# Patient Record
Sex: MALE | Race: BLACK OR AFRICAN AMERICAN | NOT HISPANIC OR LATINO | Employment: UNEMPLOYED | URBAN - METROPOLITAN AREA
[De-identification: names, ages, dates, MRNs, and addresses within clinical notes are randomized per-mention and may not be internally consistent; named-entity substitution may affect disease eponyms.]

---

## 2024-08-12 ENCOUNTER — APPOINTMENT (OUTPATIENT)
Dept: RADIOLOGY | Facility: HOSPITAL | Age: 39
End: 2024-08-12
Payer: COMMERCIAL

## 2024-08-12 ENCOUNTER — HOSPITAL ENCOUNTER (EMERGENCY)
Facility: HOSPITAL | Age: 39
Discharge: HOME | End: 2024-08-12
Attending: EMERGENCY MEDICINE
Payer: COMMERCIAL

## 2024-08-12 ENCOUNTER — APPOINTMENT (OUTPATIENT)
Dept: CARDIOLOGY | Facility: HOSPITAL | Age: 39
End: 2024-08-12
Payer: COMMERCIAL

## 2024-08-12 VITALS
HEART RATE: 50 BPM | HEIGHT: 71 IN | DIASTOLIC BLOOD PRESSURE: 74 MMHG | BODY MASS INDEX: 33.04 KG/M2 | WEIGHT: 236 LBS | SYSTOLIC BLOOD PRESSURE: 159 MMHG | TEMPERATURE: 99 F | OXYGEN SATURATION: 100 % | RESPIRATION RATE: 16 BRPM

## 2024-08-12 DIAGNOSIS — N50.82 SCROTAL PAIN: Primary | ICD-10-CM

## 2024-08-12 DIAGNOSIS — R07.9 CHEST PAIN, UNSPECIFIED TYPE: ICD-10-CM

## 2024-08-12 LAB
ALBUMIN SERPL BCP-MCNC: 4.2 G/DL (ref 3.4–5)
ALP SERPL-CCNC: 35 U/L (ref 33–120)
ALT SERPL W P-5'-P-CCNC: 29 U/L (ref 10–52)
ANION GAP SERPL CALC-SCNC: 14 MMOL/L (ref 10–20)
APPEARANCE UR: CLEAR
AST SERPL W P-5'-P-CCNC: 27 U/L (ref 9–39)
BASOPHILS # BLD AUTO: 0.03 X10*3/UL (ref 0–0.1)
BASOPHILS NFR BLD AUTO: 0.4 %
BILIRUB SERPL-MCNC: 0.7 MG/DL (ref 0–1.2)
BILIRUB UR STRIP.AUTO-MCNC: ABNORMAL MG/DL
BUN SERPL-MCNC: 7 MG/DL (ref 6–23)
CALCIUM SERPL-MCNC: 9.1 MG/DL (ref 8.6–10.3)
CHLORIDE SERPL-SCNC: 105 MMOL/L (ref 98–107)
CO2 SERPL-SCNC: 23 MMOL/L (ref 21–32)
COLOR UR: YELLOW
CREAT SERPL-MCNC: 1.01 MG/DL (ref 0.5–1.3)
D DIMER PPP FEU-MCNC: 416 NG/ML FEU
EGFRCR SERPLBLD CKD-EPI 2021: >90 ML/MIN/1.73M*2
EOSINOPHIL # BLD AUTO: 0.02 X10*3/UL (ref 0–0.7)
EOSINOPHIL NFR BLD AUTO: 0.3 %
ERYTHROCYTE [DISTWIDTH] IN BLOOD BY AUTOMATED COUNT: 13.5 % (ref 11.5–14.5)
GLUCOSE SERPL-MCNC: 103 MG/DL (ref 74–99)
GLUCOSE UR STRIP.AUTO-MCNC: NORMAL MG/DL
HCT VFR BLD AUTO: 40.5 % (ref 41–52)
HGB BLD-MCNC: 13.2 G/DL (ref 13.5–17.5)
HOLD SPECIMEN: NORMAL
IMM GRANULOCYTES # BLD AUTO: 0.01 X10*3/UL (ref 0–0.7)
IMM GRANULOCYTES NFR BLD AUTO: 0.1 % (ref 0–0.9)
KETONES UR STRIP.AUTO-MCNC: ABNORMAL MG/DL
LEUKOCYTE ESTERASE UR QL STRIP.AUTO: ABNORMAL
LYMPHOCYTES # BLD AUTO: 1.39 X10*3/UL (ref 1.2–4.8)
LYMPHOCYTES NFR BLD AUTO: 20.7 %
MCH RBC QN AUTO: 26.3 PG (ref 26–34)
MCHC RBC AUTO-ENTMCNC: 32.6 G/DL (ref 32–36)
MCV RBC AUTO: 81 FL (ref 80–100)
MONOCYTES # BLD AUTO: 0.57 X10*3/UL (ref 0.1–1)
MONOCYTES NFR BLD AUTO: 8.5 %
MUCOUS THREADS #/AREA URNS AUTO: NORMAL /LPF
NEUTROPHILS # BLD AUTO: 4.71 X10*3/UL (ref 1.2–7.7)
NEUTROPHILS NFR BLD AUTO: 70 %
NITRITE UR QL STRIP.AUTO: NEGATIVE
NRBC BLD-RTO: 0 /100 WBCS (ref 0–0)
PH UR STRIP.AUTO: 6 [PH]
PLATELET # BLD AUTO: 278 X10*3/UL (ref 150–450)
POTASSIUM SERPL-SCNC: 3.7 MMOL/L (ref 3.5–5.3)
PROT SERPL-MCNC: 7.2 G/DL (ref 6.4–8.2)
PROT UR STRIP.AUTO-MCNC: ABNORMAL MG/DL
RBC # BLD AUTO: 5.01 X10*6/UL (ref 4.5–5.9)
RBC # UR STRIP.AUTO: NEGATIVE /UL
RBC #/AREA URNS AUTO: NORMAL /HPF
SODIUM SERPL-SCNC: 138 MMOL/L (ref 136–145)
SP GR UR STRIP.AUTO: 1.04
SQUAMOUS #/AREA URNS AUTO: NORMAL /HPF
UROBILINOGEN UR STRIP.AUTO-MCNC: ABNORMAL MG/DL
WBC # BLD AUTO: 6.7 X10*3/UL (ref 4.4–11.3)
WBC #/AREA URNS AUTO: NORMAL /HPF

## 2024-08-12 PROCEDURE — 99284 EMERGENCY DEPT VISIT MOD MDM: CPT | Mod: 25

## 2024-08-12 PROCEDURE — 87086 URINE CULTURE/COLONY COUNT: CPT | Mod: PORLAB | Performed by: EMERGENCY MEDICINE

## 2024-08-12 PROCEDURE — 71046 X-RAY EXAM CHEST 2 VIEWS: CPT

## 2024-08-12 PROCEDURE — 87491 CHLMYD TRACH DNA AMP PROBE: CPT | Mod: PORLAB | Performed by: EMERGENCY MEDICINE

## 2024-08-12 PROCEDURE — 81003 URINALYSIS AUTO W/O SCOPE: CPT | Performed by: EMERGENCY MEDICINE

## 2024-08-12 PROCEDURE — 93005 ELECTROCARDIOGRAM TRACING: CPT

## 2024-08-12 PROCEDURE — 96374 THER/PROPH/DIAG INJ IV PUSH: CPT

## 2024-08-12 PROCEDURE — 80053 COMPREHEN METABOLIC PANEL: CPT | Performed by: EMERGENCY MEDICINE

## 2024-08-12 PROCEDURE — 85025 COMPLETE CBC W/AUTO DIFF WBC: CPT | Performed by: EMERGENCY MEDICINE

## 2024-08-12 PROCEDURE — 76870 US EXAM SCROTUM: CPT | Performed by: RADIOLOGY

## 2024-08-12 PROCEDURE — 36415 COLL VENOUS BLD VENIPUNCTURE: CPT | Performed by: EMERGENCY MEDICINE

## 2024-08-12 PROCEDURE — 76870 US EXAM SCROTUM: CPT

## 2024-08-12 PROCEDURE — 2500000004 HC RX 250 GENERAL PHARMACY W/ HCPCS (ALT 636 FOR OP/ED): Performed by: EMERGENCY MEDICINE

## 2024-08-12 PROCEDURE — 85379 FIBRIN DEGRADATION QUANT: CPT | Performed by: EMERGENCY MEDICINE

## 2024-08-12 RX ORDER — NAPROXEN 500 MG/1
500 TABLET ORAL
Qty: 30 TABLET | Refills: 0 | Status: SHIPPED | OUTPATIENT
Start: 2024-08-12 | End: 2024-08-27

## 2024-08-12 RX ORDER — KETOROLAC TROMETHAMINE 30 MG/ML
15 INJECTION, SOLUTION INTRAMUSCULAR; INTRAVENOUS ONCE
Status: COMPLETED | OUTPATIENT
Start: 2024-08-12 | End: 2024-08-12

## 2024-08-12 ASSESSMENT — LIFESTYLE VARIABLES
HAVE PEOPLE ANNOYED YOU BY CRITICIZING YOUR DRINKING: NO
EVER HAD A DRINK FIRST THING IN THE MORNING TO STEADY YOUR NERVES TO GET RID OF A HANGOVER: NO
TOTAL SCORE: 0
EVER FELT BAD OR GUILTY ABOUT YOUR DRINKING: NO
HAVE YOU EVER FELT YOU SHOULD CUT DOWN ON YOUR DRINKING: NO

## 2024-08-12 ASSESSMENT — PAIN DESCRIPTION - LOCATION: LOCATION: CHEST

## 2024-08-12 ASSESSMENT — COLUMBIA-SUICIDE SEVERITY RATING SCALE - C-SSRS
2. HAVE YOU ACTUALLY HAD ANY THOUGHTS OF KILLING YOURSELF?: NO
6. HAVE YOU EVER DONE ANYTHING, STARTED TO DO ANYTHING, OR PREPARED TO DO ANYTHING TO END YOUR LIFE?: NO
1. IN THE PAST MONTH, HAVE YOU WISHED YOU WERE DEAD OR WISHED YOU COULD GO TO SLEEP AND NOT WAKE UP?: NO

## 2024-08-12 ASSESSMENT — PAIN - FUNCTIONAL ASSESSMENT: PAIN_FUNCTIONAL_ASSESSMENT: 0-10

## 2024-08-12 ASSESSMENT — PAIN SCALES - GENERAL
PAINLEVEL_OUTOF10: 7
PAINLEVEL_OUTOF10: 3
PAINLEVEL_OUTOF10: 7

## 2024-08-12 ASSESSMENT — PAIN DESCRIPTION - DESCRIPTORS
DESCRIPTORS: SHARP
DESCRIPTORS: SPASM;SHARP

## 2024-08-12 ASSESSMENT — PAIN DESCRIPTION - PAIN TYPE: TYPE: ACUTE PAIN

## 2024-08-12 ASSESSMENT — PAIN DESCRIPTION - FREQUENCY: FREQUENCY: CONSTANT/CONTINUOUS

## 2024-08-12 ASSESSMENT — PAIN DESCRIPTION - ORIENTATION: ORIENTATION: LEFT

## 2024-08-12 NOTE — ED PROVIDER NOTES
HPI   Chief Complaint   Patient presents with    sharp cp left lung with deep breath/ testicular pain       HPI    Patient is a 38 yo male presenting with a one day history of left sided chest/back pain as well as a one month history of scrotal pain. PMH only notable for sciatica. In regards to chest pain, he reports it started last night and exacerbated by deep breaths and sharp in quality. Pain is localized to under the left breast and radiates to the scapula. Denies fever, SOB, injury to the area. States he has a cough for the past two weeks and smokes marijuana. Denies family history of clotting disorder and recent travel history. In regards to the scrotal pain, this has been ongoing for a month. States he has one sexual partner and has not been recently tested for STIs. Reports dark urine and burning yesterday.    ROS: Complete 12 point review of systems performed, otherwise negative except as noted in the history of present illness    PMH: Reviewed, documented below in note. Pertinents in HPI  PSH: Reviewed and documented below in note. Pertinents in HPI  SH: No tobacco, marijuana use not daily  Fam: Reviewed, noncontributory to patients current complaint  MEDS: Reviewed and documented below in note. Pertinents in HPI  ALLERGIES: Reviewed and documented below in note.    Patient History   No past medical history on file.  No past surgical history on file.  No family history on file.  Social History     Tobacco Use    Smoking status: Not on file    Smokeless tobacco: Not on file   Substance Use Topics    Alcohol use: Not on file    Drug use: Not on file       Physical Exam   ED Triage Vitals [08/12/24 0731]   Temperature Heart Rate Respirations BP   37.2 °C (99 °F) 73 18 (!) 186/87      Pulse Ox Temp Source Heart Rate Source Patient Position   99 % Temporal Monitor Sitting      BP Location FiO2 (%)     Left arm --       Physical Exam  Exam conducted with a chaperone present.   Constitutional:       General: He  is not in acute distress.  Cardiovascular:      Rate and Rhythm: Normal rate and regular rhythm.      Pulses: Normal pulses.      Heart sounds: Normal heart sounds.   Pulmonary:      Effort: Pulmonary effort is normal.      Breath sounds: Normal breath sounds.   Genitourinary:     Pubic Area: No rash.       Penis: Normal.       Testes:         Right: Tenderness present.         Left: Tenderness present.      Epididymis:      Right: Tenderness present.   Musculoskeletal:      Cervical back: Normal range of motion.   Neurological:      Mental Status: He is alert.           ED Course & MDM   Diagnoses as of 08/12/24 1052   Scrotal pain   Chest pain, unspecified type                 No data recorded     [place] Coma Scale Score: 15 (08/12/24 0735 : Miriam Anaya RN)                           Medical Decision Making    Patient is a 38 yo male presenting with a one day history of left sided chest/back pain as well as a one month history of scrotal pain. To evaluate the scrotal pain a scrotal ultrasound, UA, and C. Trachomatis/ N. Gonorrheae ordered. Differentials included epididymitis vs STI vs UTI vs testicular torsion. In regards to the chest pain a D-dimer, CBC, and CMP indicated for potential pulmonary embolism. Differentials include pulmonary embolism vs MI vs costochondritis vs pericarditis. CBC and CMP did not reveal any abnormalities. UA displayed protein, ketones, bilirubin, urobilinogen, and leukocyte esterase in the urine. Unlikely this is a bacterial infection and does not require antibiotic treatment. 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 Chest x-ray had no evidence of acute cardiopulmonary process. Most likely this patient has costochondritis which can be treated with NSAIDs. Awaiting C. Trachomatis/ N. Gonorrheae results and patient will be notified when resulted. Given naproxen upon discharge. Patient stable at discharge. Follow up with PCP within  seven days.     Procedure  Procedures     Kelly Mackey  08/12/24 1059

## 2024-08-13 LAB
BACTERIA UR CULT: NO GROWTH
C TRACH RRNA SPEC QL NAA+PROBE: NEGATIVE
N GONORRHOEA DNA SPEC QL PROBE+SIG AMP: NEGATIVE

## 2024-08-14 LAB
ATRIAL RATE: 0 BPM
P AXIS: 13 DEGREES
PR INTERVAL: 106 MS
Q ONSET: 252 MS
QRS COUNT: 9 BEATS
QRS DURATION: 104 MS
QT INTERVAL: 406 MS
QTC CALCULATION(BAZETT): 392 MS
QTC FREDERICIA: 397 MS
R AXIS: -81 DEGREES
T AXIS: 52 DEGREES
T OFFSET: 455 MS
VENTRICULAR RATE: 56 BPM

## 2024-08-16 ENCOUNTER — OFFICE VISIT (OUTPATIENT)
Dept: UROLOGY | Facility: CLINIC | Age: 39
End: 2024-08-16
Payer: COMMERCIAL

## 2024-08-16 VITALS — HEIGHT: 71 IN | WEIGHT: 236 LBS | BODY MASS INDEX: 33.04 KG/M2

## 2024-08-16 DIAGNOSIS — N41.0 ACUTE PROSTATITIS: Primary | ICD-10-CM

## 2024-08-16 LAB
POC APPEARANCE, URINE: CLEAR
POC BILIRUBIN, URINE: ABNORMAL
POC BLOOD, URINE: NEGATIVE
POC COLOR, URINE: YELLOW
POC GLUCOSE, URINE: NEGATIVE MG/DL
POC KETONES, URINE: ABNORMAL MG/DL
POC LEUKOCYTES, URINE: NEGATIVE
POC NITRITE,URINE: NEGATIVE
POC PH, URINE: 6 PH
POC PROTEIN, URINE: ABNORMAL MG/DL
POC SPECIFIC GRAVITY, URINE: >=1.03
POC UROBILINOGEN, URINE: 4 EU/DL

## 2024-08-16 PROCEDURE — 99203 OFFICE O/P NEW LOW 30 MIN: CPT | Performed by: UROLOGY

## 2024-08-16 PROCEDURE — 81003 URINALYSIS AUTO W/O SCOPE: CPT | Performed by: UROLOGY

## 2024-08-16 RX ORDER — CIPROFLOXACIN 500 MG/1
500 TABLET ORAL 2 TIMES DAILY
Qty: 42 TABLET | Refills: 0 | Status: SHIPPED | OUTPATIENT
Start: 2024-08-16 | End: 2024-09-06

## 2024-08-16 ASSESSMENT — PAIN SCALES - GENERAL: PAINLEVEL: 5

## 2024-08-16 NOTE — PROGRESS NOTES
08/16/2024  39-year-old gentleman presents 2 months perineum pain and ED    Patient has no nausea, no vomiting, no fever.    Exam: Uncircumcised normal penis normal testes bilaterally    AYAAN: Significant tenderness of prostate and a mild swelling    We discussed acute prostatitis, Cipro 500 mg twice a day for 3 weeks  We discussed erectile dysfunction, possible secondary to prostatitis  All the questions were answered, the patient expressed understanding and agreed to the plan.    Impression  Prostatitis, acute/chronic  ED    Plan  Reassurance  Cipro 500 mg twice a day for 3 weeks  Call back if no help    No chief complaint on file.       Physical Exam     TODAYS LAB RESULTS:  Urine Culture  Order: [id number] - Reflex for Order [id number]   Collected 8/12/2024 09:15       Status: Final result       Visible to patient: Yes (seen)    Specimen Information: Clean Catch/Voided; Urine   0 Result Notes  Urine Culture No growth           Resulting Agency: [facility]           Specimen Collected: 08/12/24 09:15 Last Resulted: 08/13/24 12:37       Order Details        View Encounter        Lab and Collection Details        Routing        Result History     View All Conversations on this Encounter           Result Care Coordination      Patient Communication     Add Comments   Seen Back to Top       Contains abnormal data Urinalysis with Reflex Culture and Microscopic  Order: [id number] - Part of Panel Order [id number]   Collected 8/12/2024 09:15         Status: Final result         Visible to patient: Yes (seen)      0 Result Notes           Component  Ref Range & Units 4 d ago 6 yr ago   Color, Urine  Light-Yellow, Yellow, Dark-Yellow Yellow YELLOW R   Appearance, Urine  Clear Clear CLEAR R   Specific Gravity, Urine  1.005 - 1.035 1.039 Normal (N) 1.021   pH, Urine  5.0, 5.5, 6.0, 6.5, 7.0, 7.5, 8.0 6.0 6.0 R   Protein, Urine  NEGATIVE, 10 (TRACE), 20 (TRACE) mg/dL 70 (1+) Abnormal  NEGATIVE R   Glucose, Urine  Normal mg/dL Normal  NEGATIVE R   Blood, Urine  NEGATIVE NEGATIVE NEGATIVE   Ketones, Urine  NEGATIVE mg/dL 100 (3+) Abnormal  5(Trace) Abnormal    Bilirubin, Urine  NEGATIVE 0.5 (1+) Abnormal  NEGATIVE   Urobilinogen, Urine  Normal mg/dL 6 (2+) Abnormal  4.0 High  R, CM   Comment: Some pigments and medications may cause a false positive urobilinogen.   Nitrite, Urine  NEGATIVE NEGATIVE NEGATIVE   Leukocyte Esterase, Urine  NEGATIVE 25 Juan C/µL Abnormal  NEGATIVE   Resulting [facility]              Specimen Collected: 08/12/24 09:15 Last Resulted: 08/12/24 09:35        Lab Flowsheet          Order Details          View Encounter          Lab and Collection Details          Routing          Result History       View All Conversations on this Encounter        CM=Additional comments  R=Reference range differs from displayed range        Result Care Coordination      Patient Communication     Add Comments   Seen Back to Top      Microscopic Only, Urine  Order: [id number] - Reflex for Order [id number]   Collected 8/12/2024 09:15         Status: Final result         Visible to patient: Yes (seen)      0 Result Notes          Component  Ref Range & Units 4 d ago   WBC, Urine  1-5, NONE /HPF 1-5   RBC, Urine  NONE, 1-2, 3-5 /HPF 3-5   Squamous Epithelial Cells, Urine  Reference range not established. /HPF 1-9 (SPARSE)   Mucus, Urine  Reference range not established. /LPF 4+   Resulting [facility]              Specimen Collected: 08/12/24 09:15 Last Resulted: 08/12/24 09:35        Lab Flowsheet          Order Details          View Encounter          Lab and Collection Details          Routing          Result History       View All Conversations on this Encounter           Result Care Coordination      Patient Communication     Add Comments   Seen Back to Top        Result Report    Urine Culture (Order #[id number]) on 8/12/24   Microscopic Only, Urine  Order: [id number] - Reflex for Order [id number]   Status: Final result        Visible to patient: Yes (seen)    0 Result Notes      Component  Ref Range & Units 4 d ago   WBC, Urine  1-5, NONE /HPF 1-5   RBC, Urine  NONE, 1-2, 3-5 /HPF 3-5   Squamous Epithelial Cells, Urine  Reference range not established. /HPF 1-9 (SPARSE)   Mucus, Urine  Reference range not established. /LPF 4+   Resulting Agency [facility]              Specimen Collected: 08/12/24 09:15 Last Resulted: 08/12/24 09:35        Lab Flowsheet        Order Details        View Encounter        Lab and Collection Details        Routing        Result History     View All Conversations on this Encounter           Result Care Coordination      Patient Communication     Add Comments   Seen Back to Top      Urine Culture  Order: [id number] - Reflex for Order [id number]   Status: Final result         Visible to patient: Yes (seen)      Specimen Information: Clean Catch/Voided; Urine   0 Result Notes      Urine Culture No growth           Resulting Agency: [facility]           Specimen Collected: 08/12/24 09:15 Last Resulted: 08/13/24 12:37       Order Details          View Encounter          Lab and Collection Details          Routing          Result History       View All Conversations on this Encounter           Result Care Coordination      Patient Communication     Add Comments   Seen Back to Top          ASSESSMENT&PLAN:      IMPRESSIONS:      S scrotum  Status: Final result     PACS Images     Show images for US scrotum  Signed by    Signed Time Phone Pager   Farhan Stringer MD 8/12/2024 10:35 [phone]     Exam Information    Status Exam Begun Exam Ended   Final 8/12/2024 09:48 8/12/2024 10:16     Study Result    Narrative & Impression   Interpreted By:  Farhan Stringer,   STUDY:  US SCROTUM;  8/12/2024 10:16 am      INDICATION:  Signs/Symptoms:testicle pain.      COMPARISON:  None.      ACCESSION NUMBER(S):  [id number]      ORDERING CLINICIAN:  AMRITA BRADSHAW      TECHNIQUE:  Multiple ultrasonographic images of  scrotum and tested were obtained.      FINDINGS:  RIGHT HEMISCROTUM:      RIGHT TESTICLE:  The right testicle measures 2.4 cm x 1.9 cmx 3.9 cm. Diffuse  testicular microlithiasis. No mass. Normal vascularity and Doppler  waveforms are observed in the right testicle.      RIGHT EPIDIDYMIS:  The right epididymal head measures 0.9 cm x 0.7 cm x 1.0 cm and is  within normal limits.      LEFT HEMISCROTUM:      LEFT TESTICLE:  The left testicle measures 2.6 cm x 1.9 cm x 3.6 cm. Diffuse  testicular microlithiasis. No mass. Normal vascularity and Doppler  waveforms are observed in the left testicle.      LEFT EPIDIDYMIS:  The left epididymal head measures 1.0 cm x 0.6 cm x 1.0 cm and is  within normal limits.      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      No evidence of testicular torsion or epididymo-orchitis.      MACRO:  None      Signed by: aFrhan Stringer 8/12/2024 10:35 AM  Dictation workstation:   [id number]     Result History    US scrotum (Order #[id number]) on 8/12/2024 - Order Result History Report    Breast Imaging Recommendations  Lakia Ferrari  No recommendations exist for this order.         External Results Report    Open External Results Report    Encounter    View Encounter      Study Details    Open Study Details       US scrotum: Patient Communication

## 2024-09-03 ENCOUNTER — APPOINTMENT (OUTPATIENT)
Dept: UROLOGY | Facility: CLINIC | Age: 39
End: 2024-09-03